# Patient Record
Sex: FEMALE | Race: WHITE | Employment: FULL TIME | URBAN - METROPOLITAN AREA
[De-identification: names, ages, dates, MRNs, and addresses within clinical notes are randomized per-mention and may not be internally consistent; named-entity substitution may affect disease eponyms.]

---

## 2018-02-15 ENCOUNTER — OFFICE VISIT (OUTPATIENT)
Dept: INTERNAL MEDICINE CLINIC | Age: 33
End: 2018-02-15

## 2018-02-15 VITALS
BODY MASS INDEX: 34.89 KG/M2 | OXYGEN SATURATION: 98 % | WEIGHT: 204.4 LBS | HEIGHT: 64 IN | SYSTOLIC BLOOD PRESSURE: 114 MMHG | HEART RATE: 77 BPM | DIASTOLIC BLOOD PRESSURE: 76 MMHG

## 2018-02-15 DIAGNOSIS — E66.09 CLASS 2 OBESITY DUE TO EXCESS CALORIES WITHOUT SERIOUS COMORBIDITY WITH BODY MASS INDEX (BMI) OF 35.0 TO 35.9 IN ADULT: ICD-10-CM

## 2018-02-15 DIAGNOSIS — M54.41 CHRONIC RIGHT-SIDED LOW BACK PAIN WITH RIGHT-SIDED SCIATICA: ICD-10-CM

## 2018-02-15 DIAGNOSIS — Z00.00 GENERAL MEDICAL EXAMINATION: ICD-10-CM

## 2018-02-15 DIAGNOSIS — R10.84 GENERALIZED ABDOMINAL PAIN: ICD-10-CM

## 2018-02-15 DIAGNOSIS — G89.29 CHRONIC RIGHT-SIDED LOW BACK PAIN WITH RIGHT-SIDED SCIATICA: ICD-10-CM

## 2018-02-15 DIAGNOSIS — Z23 NEED FOR PROPHYLACTIC VACCINATION AGAINST DIPHTHERIA-TETANUS-PERTUSSIS (DTP): ICD-10-CM

## 2018-02-15 DIAGNOSIS — Z00.00 GENERAL MEDICAL EXAMINATION: Primary | ICD-10-CM

## 2018-02-15 DIAGNOSIS — Z11.4 ENCOUNTER FOR SCREENING FOR HIV: ICD-10-CM

## 2018-02-15 LAB
A/G RATIO: 1.9 (ref 1.1–2.2)
ALBUMIN SERPL-MCNC: 4.5 G/DL (ref 3.4–5)
ALP BLD-CCNC: 40 U/L (ref 40–129)
ALT SERPL-CCNC: 20 U/L (ref 10–40)
ANION GAP SERPL CALCULATED.3IONS-SCNC: 12 MMOL/L (ref 3–16)
AST SERPL-CCNC: 18 U/L (ref 15–37)
BILIRUB SERPL-MCNC: 0.6 MG/DL (ref 0–1)
BUN BLDV-MCNC: 16 MG/DL (ref 7–20)
CALCIUM SERPL-MCNC: 9.3 MG/DL (ref 8.3–10.6)
CHLORIDE BLD-SCNC: 106 MMOL/L (ref 99–110)
CHOLESTEROL, TOTAL: 163 MG/DL (ref 0–199)
CO2: 27 MMOL/L (ref 21–32)
CREAT SERPL-MCNC: 0.5 MG/DL (ref 0.6–1.1)
GFR AFRICAN AMERICAN: >60
GFR NON-AFRICAN AMERICAN: >60
GLOBULIN: 2.4 G/DL
GLUCOSE BLD-MCNC: 104 MG/DL (ref 70–99)
HCT VFR BLD CALC: 39.4 % (ref 36–48)
HDLC SERPL-MCNC: 40 MG/DL (ref 40–60)
HEMOGLOBIN: 13.7 G/DL (ref 12–16)
LDL CHOLESTEROL CALCULATED: 95 MG/DL
MCH RBC QN AUTO: 30.6 PG (ref 26–34)
MCHC RBC AUTO-ENTMCNC: 34.8 G/DL (ref 31–36)
MCV RBC AUTO: 87.9 FL (ref 80–100)
PDW BLD-RTO: 12.8 % (ref 12.4–15.4)
PLATELET # BLD: 272 K/UL (ref 135–450)
PMV BLD AUTO: 9.6 FL (ref 5–10.5)
POTASSIUM SERPL-SCNC: 4.4 MMOL/L (ref 3.5–5.1)
RBC # BLD: 4.48 M/UL (ref 4–5.2)
SODIUM BLD-SCNC: 145 MMOL/L (ref 136–145)
TOTAL PROTEIN: 6.9 G/DL (ref 6.4–8.2)
TRIGL SERPL-MCNC: 142 MG/DL (ref 0–150)
VLDLC SERPL CALC-MCNC: 28 MG/DL
WBC # BLD: 8.5 K/UL (ref 4–11)

## 2018-02-15 PROCEDURE — 99385 PREV VISIT NEW AGE 18-39: CPT | Performed by: NURSE PRACTITIONER

## 2018-02-15 PROCEDURE — 90471 IMMUNIZATION ADMIN: CPT | Performed by: NURSE PRACTITIONER

## 2018-02-15 PROCEDURE — 90715 TDAP VACCINE 7 YRS/> IM: CPT | Performed by: NURSE PRACTITIONER

## 2018-02-15 RX ORDER — NAPROXEN 250 MG/1
250 TABLET ORAL 2 TIMES DAILY WITH MEALS
COMMUNITY
End: 2018-04-16 | Stop reason: ALTCHOICE

## 2018-02-15 RX ORDER — IBUPROFEN 200 MG
200 TABLET ORAL EVERY 6 HOURS PRN
COMMUNITY
End: 2018-04-16 | Stop reason: ALTCHOICE

## 2018-02-15 RX ORDER — CYCLOBENZAPRINE HCL 5 MG
5 TABLET ORAL 3 TIMES DAILY PRN
Qty: 20 TABLET | Refills: 0 | Status: SHIPPED | OUTPATIENT
Start: 2018-02-15 | End: 2019-03-19 | Stop reason: CLARIF

## 2018-02-15 RX ORDER — PREDNISONE 10 MG/1
TABLET ORAL
Qty: 20 TABLET | Refills: 0 | Status: SHIPPED | OUTPATIENT
Start: 2018-02-15 | End: 2018-02-26 | Stop reason: ALTCHOICE

## 2018-02-15 RX ORDER — DICYCLOMINE HYDROCHLORIDE 10 MG/1
10 CAPSULE ORAL 4 TIMES DAILY
Qty: 120 CAPSULE | Refills: 3 | Status: SHIPPED | OUTPATIENT
Start: 2018-02-15 | End: 2019-03-19 | Stop reason: CLARIF

## 2018-02-15 ASSESSMENT — PATIENT HEALTH QUESTIONNAIRE - PHQ9
2. FEELING DOWN, DEPRESSED OR HOPELESS: 0
SUM OF ALL RESPONSES TO PHQ9 QUESTIONS 1 & 2: 0
SUM OF ALL RESPONSES TO PHQ QUESTIONS 1-9: 0
1. LITTLE INTEREST OR PLEASURE IN DOING THINGS: 0

## 2018-02-15 NOTE — PROGRESS NOTES
Position: Sitting, Cuff Size: Medium Adult)   Pulse 77   Ht 5' 3.5\" (1.613 m)   Wt 204 lb 6.4 oz (92.7 kg)   LMP 01/20/2018 (Approximate)   SpO2 98%   BMI 35.64 kg/m²   BP Readings from Last 3 Encounters:   02/15/18 114/76   09/23/14 118/76   05/22/13 99/51     Wt Readings from Last 3 Encounters:   02/15/18 204 lb 6.4 oz (92.7 kg)   09/23/14 208 lb 12.8 oz (94.7 kg)   05/22/13 195 lb (88.5 kg)       Physical Exam   Constitutional: She is oriented to person, place, and time. She appears well-developed and well-nourished. HENT:   Right Ear: External ear normal.   Left Ear: External ear normal.   Eyes: Conjunctivae, EOM and lids are normal. Pupils are equal, round, and reactive to light. Right conjunctiva is not injected. Left conjunctiva is not injected. No scleral icterus. Right eye exhibits no nystagmus. Left eye exhibits no nystagmus. Pupils are equal.   Neck: Trachea normal. Normal carotid pulses and no JVD present. Carotid bruit is not present. No thyroid mass and no thyromegaly present. Cardiovascular: Normal rate, regular rhythm, S1 normal and S2 normal.    No murmur heard. Pulses:       Dorsalis pedis pulses are 2+ on the right side, and 2+ on the left side. Posterior tibial pulses are 2+ on the right side, and 2+ on the left side. Pulmonary/Chest: Breath sounds normal. She has no wheezes. She has no rhonchi. She has no rales. Abdominal: Soft. Bowel sounds are normal. She exhibits no mass. There is no hepatosplenomegaly. There is tenderness. There is no rigidity, no guarding and no CVA tenderness. No hernia. Musculoskeletal:        Lumbar back: She exhibits tenderness. She exhibits normal range of motion and no bony tenderness (paraspinal muscle tenderness).    - Tenderness to Palpation: right paralumbar region   - Range of Motion:     flexion- diminished range of motion     extension- diminished range of motion    rotation- full range of motion, diminished    Lymphadenopathy: Head (right side): No submental, no submandibular, no tonsillar, no preauricular, no posterior auricular and no occipital adenopathy present. Head (left side): No submental, no submandibular, no tonsillar, no preauricular, no posterior auricular and no occipital adenopathy present. She has no cervical adenopathy. Neurological: She is alert and oriented to person, place, and time. She displays no tremor. No cranial nerve deficit or sensory deficit. Reflex Scores:       Bicep reflexes are 2+ on the right side and 2+ on the left side. Patellar reflexes are 2+ on the right side and 2+ on the left side. Neurological:   - Straight leg raise is positive on the right.   - Gait is normal.   Psychiatric: She has a normal mood and affect. Her speech is normal and behavior is normal. Judgment and thought content normal. Cognition and memory are normal.       Lab Results   Component Value Date    WBC 8.7 09/23/2014    HGB 13.9 09/23/2014    HCT 40.6 09/23/2014    MCV 86.1 09/23/2014     09/23/2014     Lab Results   Component Value Date     09/23/2014    K 3.5 09/23/2014     09/23/2014    CO2 25 09/23/2014    BUN 10 09/23/2014    CREATININE 0.7 09/23/2014    GLUCOSE 122 (H) 09/23/2014    CALCIUM 9.2 09/23/2014    PROT 7.2 09/23/2014    LABALBU 4.3 09/23/2014    BILITOT 0.5 09/23/2014    ALKPHOS 49 09/23/2014    AST 19 09/23/2014    ALT 35 09/23/2014    LABGLOM 117 09/23/2014    AGRATIO 1.5 09/23/2014    GLOB 2.9 09/23/2014     Lab Results   Component Value Date    CHOL 170 09/23/2014     Lab Results   Component Value Date    TRIG 238 (H) 09/23/2014     Lab Results   Component Value Date    HDL 30 (L) 09/23/2014     Lab Results   Component Value Date    LDLCHOLESTEROL 92 09/23/2014     No results found for: LABA1C  Lab Results   Component Value Date    TSH 1.1 09/23/2014       ASSESSMENT:      1. General medical examination    2.  Chronic right-sided low back pain with right-sided sciatica

## 2018-02-15 NOTE — PATIENT INSTRUCTIONS
Patient Education        Well Visit, Ages 25 to 48: Care Instructions  Your Care Instructions    Physical exams can help you stay healthy. Your doctor has checked your overall health and may have suggested ways to take good care of yourself. He or she also may have recommended tests. At home, you can help prevent illness with healthy eating, regular exercise, and other steps. Follow-up care is a key part of your treatment and safety. Be sure to make and go to all appointments, and call your doctor if you are having problems. It's also a good idea to know your test results and keep a list of the medicines you take. How can you care for yourself at home? · Reach and stay at a healthy weight. This will lower your risk for many problems, such as obesity, diabetes, heart disease, and high blood pressure. · Get at least 30 minutes of physical activity on most days of the week. Walking is a good choice. You also may want to do other activities, such as running, swimming, cycling, or playing tennis or team sports. Discuss any changes in your exercise program with your doctor. · Do not smoke or allow others to smoke around you. If you need help quitting, talk to your doctor about stop-smoking programs and medicines. These can increase your chances of quitting for good. · Talk to your doctor about whether you have any risk factors for sexually transmitted infections (STIs). Having one sex partner (who does not have STIs and does not have sex with anyone else) is a good way to avoid these infections. · Use birth control if you do not want to have children at this time. Talk with your doctor about the choices available and what might be best for you. · Protect your skin from too much sun. When you're outdoors from 10 a.m. to 4 p.m., stay in the shade or cover up with clothing and a hat with a wide brim. Wear sunglasses that block UV rays.  Even when it's cloudy, put broad-spectrum sunscreen (SPF 30 or higher) on any exposed skin. · See a dentist one or two times a year for checkups and to have your teeth cleaned. · Wear a seat belt in the car. · Drink alcohol in moderation, if at all. That means no more than 2 drinks a day for men and 1 drink a day for women. Follow your doctor's advice about when to have certain tests. These tests can spot problems early. For everyone  · Cholesterol. Have the fat (cholesterol) in your blood tested after age 21. Your doctor will tell you how often to have this done based on your age, family history, or other things that can increase your risk for heart disease. · Blood pressure. Have your blood pressure checked during a routine doctor visit. Your doctor will tell you how often to check your blood pressure based on your age, your blood pressure results, and other factors. · Vision. Talk with your doctor about how often to have a glaucoma test.  · Diabetes. Ask your doctor whether you should have tests for diabetes. · Colon cancer. Have a test for colon cancer at age 48. You may have one of several tests. If you are younger than 48, you may need a test earlier if you have any risk factors. Risk factors include whether you already had a precancerous polyp removed from your colon or whether your parent, brother, sister, or child has had colon cancer. For women  · Breast exam and mammogram. Talk to your doctor about when you should have a clinical breast exam and a mammogram. Medical experts differ on whether and how often women under 50 should have these tests. Your doctor can help you decide what is right for you. · Pap test and pelvic exam. Begin Pap tests at age 24. A Pap test is the best way to find cervical cancer. The test often is part of a pelvic exam. Ask how often to have this test.  · Tests for sexually transmitted infections (STIs). Ask whether you should have tests for STIs.  You may be at risk if you have sex with more than one person, especially if your partners do not wear condoms. For men  · Tests for sexually transmitted infections (STIs). Ask whether you should have tests for STIs. You may be at risk if you have sex with more than one person, especially if you do not wear a condom. · Testicular cancer exam. Ask your doctor whether you should check your testicles regularly. · Prostate exam. Talk to your doctor about whether you should have a blood test (called a PSA test) for prostate cancer. Experts differ on whether and when men should have this test. Some experts suggest it if you are older than 39 and are -American or have a father or brother who got prostate cancer when he was younger than 72. When should you call for help? Watch closely for changes in your health, and be sure to contact your doctor if you have any problems or symptoms that concern you. Where can you learn more? Go to https://[email or web address].[email or web address]. org and sign in to your [employer] account. Enter P072 in the Epuls box to learn more about \"Well Visit, Ages 25 to 48: Care Instructions. \"     If you do not have an account, please click on the \"Sign Up Now\" link. Current as of: May 12, 2017  Content Version: 11.5  © 1465-6432 [employer]. Care instructions adapted under license by [facility] ([facility]). If you have questions about a medical condition or this instruction, always ask your healthcare professional. [place] [address] any warranty or liability for your use of this information. Patient Education        Back Stretches: Exercises  Your Care Instructions  Here are some examples of exercises for stretching your back. Start each exercise slowly. Ease off the exercise if you start to have pain. Your doctor or physical therapist will tell you when you can start these exercises and which ones will work best for you. How to do the exercises  Overhead stretch    1. Stand comfortably with your feet shoulder-width apart.   2. Looking straight ahead, elva. [address] any warranty or liability for your use of this information. Patient Education          dicyclomine  Pronunciation:  quynh GREWAL dahlia myers  Brand:  Thomas  What is the most important information I should know about dicyclomine? This medication may impair your thinking or reactions. Be careful if you drive or do anything that requires you to be alert. Drinking alcohol can increase certain side effects of dicyclomine. Avoid becoming overheated or dehydrated during exercise and in hot weather. Dicyclomine can decrease your sweating, which can lead to heat stroke in a hot environment. Stop using dicyclomine and call your doctor right away if you have serious side effects such as confusion, hallucinations, unusual thoughts or behavior, fast or uneven heart rate, or if you urinate less than usual or not at all. There are many other medicines that can interact with dicyclomine. Tell your doctor about all the prescription and over-the-counter medications you use. This includes vitamins, minerals, herbal products, and drugs prescribed by other doctors. Do not start using a new medication without telling your doctor. Keep a list with you of all the medicines you use and show this list to any doctor or other healthcare provider who treats you. What is dicyclomine? Dicyclomine relieves spasms of the muscles in the stomach and intestines. Dicyclomine is used to treat functional bowel or irritable bowel syndrome. Dicyclomine may also be used for purposes not listed in this medication guide. What should I discuss with my healthcare provider before taking dicyclomine?   You should not take this medication if you are allergic to dicyclomine, or if you have:  · problems with urination;  · a bowel obstruction or severe constipation;  · severe ulcerative colitis or toxic megacolon;  · gastroesophageal reflux disease (GERD);  · a serious heart condition or active interact with dicyclomine. Tell your doctor about all medications you use. This includes prescription, over-the-counter, vitamin, and herbal products. Do not start a new medication without telling your doctor. Where can I get more information? Your pharmacist can provide more information about dicyclomine. Remember, keep this and all other medicines out of the reach of children, never share your medicines with others, and use this medication only for the indication prescribed. Every effort has been made to ensure that the information provided by [address] is accurate, up-to-date, and complete, but no guarantee is made to that effect. Drug information contained herein may be time sensitive. [facility] information has been compiled for use by healthcare practitioners and consumers in the [place] and therefore [facility] does not warrant that uses outside of the [place] are appropriate, unless specifically indicated otherwise. [facility]'s drug information does not endorse drugs, diagnose patients or recommend therapy. [facility] drug information is an informational resource designed to assist licensed healthcare practitioners in caring for their patients and/or to serve consumers viewing this service as a supplement to, and not a substitute for, the expertise, skill, knowledge and judgment of healthcare practitioners. The absence of a warning for a given drug or drug combination in no way should be construed to indicate that the drug or drug combination is safe, effective or appropriate for any given patient. [facility] does not assume any responsibility for any aspect of healthcare administered with the aid of information [facility] provides. The information contained herein is not intended to cover all possible uses, directions, precautions, warnings, drug interactions, allergic reactions, or adverse effects.  If you have questions about the drugs you are taking, check with your doctor, nurse or

## 2018-02-16 LAB
HIV AG/AB: NORMAL
HIV ANTIGEN: NORMAL
HIV-1 ANTIBODY: NORMAL
HIV-2 AB: NORMAL

## 2018-02-26 ENCOUNTER — OFFICE VISIT (OUTPATIENT)
Dept: GASTROENTEROLOGY | Age: 33
End: 2018-02-26

## 2018-02-26 VITALS
OXYGEN SATURATION: 98 % | BODY MASS INDEX: 37.25 KG/M2 | SYSTOLIC BLOOD PRESSURE: 102 MMHG | HEIGHT: 62 IN | DIASTOLIC BLOOD PRESSURE: 84 MMHG | WEIGHT: 202.4 LBS | HEART RATE: 94 BPM

## 2018-02-26 DIAGNOSIS — R19.4 ALTERED BOWEL HABITS: ICD-10-CM

## 2018-02-26 DIAGNOSIS — R14.0 ABDOMINAL BLOATING: ICD-10-CM

## 2018-02-26 DIAGNOSIS — G89.29 CHRONIC RIGHT-SIDED LOW BACK PAIN WITH RIGHT-SIDED SCIATICA: Primary | ICD-10-CM

## 2018-02-26 DIAGNOSIS — R19.7 DIARRHEA, UNSPECIFIED TYPE: ICD-10-CM

## 2018-02-26 DIAGNOSIS — M54.41 CHRONIC RIGHT-SIDED LOW BACK PAIN WITH RIGHT-SIDED SCIATICA: Primary | ICD-10-CM

## 2018-02-26 DIAGNOSIS — R11.0 NAUSEA: ICD-10-CM

## 2018-02-26 DIAGNOSIS — R10.13 EPIGASTRIC PAIN: Primary | ICD-10-CM

## 2018-02-26 PROCEDURE — 99204 OFFICE O/P NEW MOD 45 MIN: CPT | Performed by: NURSE PRACTITIONER

## 2018-02-26 RX ORDER — POLYETHYLENE GLYCOL 3350 17 G/17G
238 POWDER, FOR SOLUTION ORAL DAILY
Qty: 7140 G | Refills: 0 | Status: SHIPPED | OUTPATIENT
Start: 2018-02-26 | End: 2018-03-28

## 2018-02-26 ASSESSMENT — ENCOUNTER SYMPTOMS
DIARRHEA: 1
SHORTNESS OF BREATH: 0
WHEEZING: 0
VOMITING: 0
SPUTUM PRODUCTION: 0
HEMOPTYSIS: 0
PHOTOPHOBIA: 0
NAUSEA: 0
CONSTIPATION: 1
COUGH: 0
BLURRED VISION: 0
DOUBLE VISION: 0
BLOOD IN STOOL: 0
BACK PAIN: 1
HEARTBURN: 0
ABDOMINAL PAIN: 1

## 2018-02-26 NOTE — PATIENT INSTRUCTIONS
Patient Education        Frequent Abdominal Pain: Care Instructions  Your Care Instructions    Frequent abdominal pain means you have belly pain that occurs at least 3 times over 3 months. Sometimes the pain is linked to eating certain foods or having a bowel movement. But most of the time the pain cannot be explained. Your doctor may use the words \"functional abdominal pain\" or \"recurrent abdominal pain\" to describe the problem. It can be hard to deal with pain when your doctor cannot find a cause, even after tests are done. When the pain is very bad, it can keep you from doing your normal activities. Sometimes stress can make your pain worse. Even if you cannot make the pain go away, there are things you can do to make it a little easier to manage. Follow-up care is a key part of your treatment and safety. Be sure to make and go to all appointments, and call your doctor if you are having problems. It's also a good idea to know your test results and keep a list of the medicines you take. How can you care for yourself at home? · Keep a symptom diary. This can help you see if there are events or emotions that make your pain worse. Think about what you ate, drank, or felt before the pain began. Maybe pain comes after a stressful meeting or when you have spicy foods, dairy products, or alcohol. · Reduce stress. Breathing exercises and relaxation techniques can help. Try taking a walk or doing other exercise when you feel stressed. · Try cognitive-behavioral therapy. You can work with a counselor to learn how to do this therapy. It can help you cope with pain by changing how you think. It can help you notice discouraging thoughts that make you feel bad. When should you call for help? Call your doctor now or seek immediate medical care if:  ? · You have a fever and belly pain. ? · You have severe pain that is different from your usual belly pain. ? Watch closely for changes in your health, and be sure to your procedure may be canceled. If your doctor told you to take your medicines on the day of the procedure, take them with only a sip of water. ? · Take a bath or shower before you come in for your procedure. Do not apply lotions, perfumes, deodorants, or nail polish. ? · Take off all jewelry and piercings. And take out contact lenses, if you wear them. ? At the hospital or surgery center   · Bring a picture ID. ? · The test may take 15 to 30 minutes. ? · The doctor may spray medicine on the back of your throat to numb it. You also will get medicine to prevent pain and to relax you. ? · You will lie on your left side. The doctor will put the scope in your mouth and toward the back of your throat. The doctor will tell you when to swallow. This helps the scope move down your throat. You will be able to breathe normally. The doctor will move the scope down your esophagus into your stomach. The doctor also may look at the duodenum. ? · If your doctor wants to take a sample of tissue for a biopsy, he or she may use small surgical tools, which are put into the scope, to cut off some tissue. You will not feel a biopsy, if one is taken. The doctor also can use the tools to stop bleeding or to do other treatments, if needed. ? · You will stay at the hospital or surgery center for 1 to 2 hours until the medicine you were given wears off. Going home   · Be sure you have someone to drive you home. Anesthesia and pain medicine make it unsafe for you to drive. ? · You will be given more specific instructions about recovering from your procedure. They will cover things like diet, wound care, follow-up care, driving, and getting back to your normal routine. When should you call your doctor? · You have questions or concerns. ? · You don't understand how to prepare for your procedure. ? · You become ill before the procedure (such as fever, flu, or a cold).    ? · You need to reschedule or have changed your ? · If you take blood thinners, such as warfarin (Coumadin), clopidogrel (Plavix), or aspirin, be sure to talk to your doctor. He or she will tell you if you should stop taking these medicines before your procedure. Make sure that you understand exactly what your doctor wants you to do.   ? · Your doctor will tell you which medicines to take or stop before your procedure. You may need to stop taking certain medicines a week or more before the procedure. So talk to your doctor as soon as you can.   ? · If you have an advance directive, let your doctor know. It may include a living will and a durable power of  for health care. Bring a copy to the hospital. If you don't have one, you may want to prepare one. It lets your doctor and loved ones know your health care wishes. Doctors advise that everyone prepare these papers before any type of surgery or procedure. ? Before the procedure  ? · Follow your doctor's directions about when to stop eating solid foods and drink only clear liquids. You can drink water, clear juices, clear broths, flavored ice pops, and gelatin (such as Jell-O). Do not eat or drink anything red or purple. This includes grape juice and grape-flavored ice pops. It also includes fruit punch and cherry gelatin. ? · Drink the \"colon prep\" liquid as your doctor tells you. You will want to stay home, because the liquid will make you go to the bathroom a lot. Your stools will be loose and watery. It is very important to drink all of the liquid. If you have problems drinking it, call your doctor. Some doctors may have you take a tablet rather than drink a liquid. ? · Do not eat any solid foods after you drink the colon prep. ? · Stop drinking clear liquids 6 to 8 hours before the test.   Procedures can be stressful. This information will help you understand what you can expect. And it will help you safely prepare for your procedure. What happens on the day of the procedure?    · Follow the

## 2018-02-26 NOTE — PROGRESS NOTES
Range Status    HIV Ag/Ab 02/15/2018 Non-Reactive  Non-reactive Final    HIV-1 Antibody 02/15/2018 Non-Reactive  Non-reactive Final    HIV ANTIGEN 02/15/2018 Non-Reactive  Non-reactive Final    HIV-2 Ab 02/15/2018 Non-Reactive  Non-reactive Final    WBC 02/15/2018 8.5  4.0 - 11.0 K/uL Final    RBC 02/15/2018 4.48  4.00 - 5.20 M/uL Final    Hemoglobin 02/15/2018 13.7  12.0 - 16.0 g/dL Final    Hematocrit 02/15/2018 39.4  36.0 - 48.0 % Final    MCV 02/15/2018 87.9  80.0 - 100.0 fL Final    MCH 02/15/2018 30.6  26.0 - 34.0 pg Final    MCHC 02/15/2018 34.8  31.0 - 36.0 g/dL Final    RDW 02/15/2018 12.8  12.4 - 15.4 % Final    Platelets 83/29/6953 272  135 - 450 K/uL Final    MPV 02/15/2018 9.6  5.0 - 10.5 fL Final    Sodium 02/15/2018 145  136 - 145 mmol/L Final    Potassium 02/15/2018 4.4  3.5 - 5.1 mmol/L Final    Chloride 02/15/2018 106  99 - 110 mmol/L Final    CO2 02/15/2018 27  21 - 32 mmol/L Final    Anion Gap 02/15/2018 12  3 - 16 Final    Glucose 02/15/2018 104* 70 - 99 mg/dL Final    BUN 02/15/2018 16  7 - 20 mg/dL Final    CREATININE 02/15/2018 0.5* 0.6 - 1.1 mg/dL Final    GFR Non- 02/15/2018 >60  >60 Final    Comment: >60 mL/min/1.73m2 EGFR, calc. for ages 25 and older using the  MDRD formula (not corrected for weight), is valid for stable  renal function.  GFR  02/15/2018 >60  >60 Final    Comment: Chronic Kidney Disease: less than 60 ml/min/1.73 sq.m. Kidney Failure: less than 15 ml/min/1.73 sq.m. Results valid for patients 18 years and older.       Calcium 02/15/2018 9.3  8.3 - 10.6 mg/dL Final    Total Protein 02/15/2018 6.9  6.4 - 8.2 g/dL Final    Alb 02/15/2018 4.5  3.4 - 5.0 g/dL Final    Albumin/Globulin Ratio 02/15/2018 1.9  1.1 - 2.2 Final    Total Bilirubin 02/15/2018 0.6  0.0 - 1.0 mg/dL Final    Alkaline Phosphatase 02/15/2018 40  40 - 129 U/L Final    ALT 02/15/2018 20  10 - 40 U/L Final    AST 02/15/2018 18  15 - 37 U/L Final    Globulin 02/15/2018 2.4  g/dL Final    Cholesterol, Total 02/15/2018 163  0 - 199 mg/dL Final    Triglycerides 02/15/2018 142  0 - 150 mg/dL Final    HDL 02/15/2018 40  40 - 60 mg/dL Final    LDL Calculated 02/15/2018 95  <100 mg/dL Final    VLDL Cholesterol Calculated 02/15/2018 28  Not Established mg/dL Final       Assessment      ICD-10-CM ICD-9-CM    1. Epigastric pain R10.13 789.06 ESOPHAGOSCOPY / EGD   2. Nausea R11.0 787.02 ESOPHAGOSCOPY / EGD   3. Abdominal bloating R14.0 787.3 COLONOSCOPY (Diagnostic)      ESOPHAGOSCOPY / EGD   4. Diarrhea, unspecified type R19.7 787.91 COLONOSCOPY (Diagnostic)   5. Altered bowel habits R19.4 787.99        Plan    1. Will plan for an EGD/colonoscopy with MAC anesthesia. The patient was informed of the risks and benefits of the procedures. 2.   The patient was encouraged to keep tract of foods that worsen her pain may be a allergic response. 3.  The patient was provided with information on gas and bloating. Avoid foods that worsen. 4.  The patient was encouraged to lose weight at least 10% of her body weight. CT showed fatty liver in 2013. Avoid excess sugar intake. 5.  Will obtain [place] [facility] [place]  6. Further recommendations for follow-up will be determined after the EGD/colonoscopy have been completed.

## 2018-02-28 ENCOUNTER — HOSPITAL ENCOUNTER (OUTPATIENT)
Dept: GENERAL RADIOLOGY | Age: 33
Discharge: HOME | End: 2018-02-28
Attending: NURSE PRACTITIONER | Admitting: NURSE PRACTITIONER

## 2018-02-28 DIAGNOSIS — M54.41 CHRONIC RIGHT-SIDED LOW BACK PAIN WITH RIGHT-SIDED SCIATICA: ICD-10-CM

## 2018-02-28 DIAGNOSIS — G89.29 CHRONIC RIGHT-SIDED LOW BACK PAIN WITH RIGHT-SIDED SCIATICA: ICD-10-CM

## 2018-03-02 ENCOUNTER — OFFICE VISIT (OUTPATIENT)
Dept: INTERNAL MEDICINE CLINIC | Age: 33
End: 2018-03-02

## 2018-03-02 ENCOUNTER — HOSPITAL ENCOUNTER (OUTPATIENT)
Dept: PHYSICAL THERAPY | Age: 33
Discharge: HOME | End: 2018-03-31
Attending: NURSE PRACTITIONER | Admitting: NURSE PRACTITIONER

## 2018-03-02 VITALS — HEART RATE: 89 BPM | SYSTOLIC BLOOD PRESSURE: 112 MMHG | OXYGEN SATURATION: 98 % | DIASTOLIC BLOOD PRESSURE: 76 MMHG

## 2018-03-02 DIAGNOSIS — G89.29 CHRONIC RIGHT-SIDED LOW BACK PAIN WITH RIGHT-SIDED SCIATICA: Primary | ICD-10-CM

## 2018-03-02 DIAGNOSIS — M54.41 CHRONIC RIGHT-SIDED LOW BACK PAIN WITH RIGHT-SIDED SCIATICA: Primary | ICD-10-CM

## 2018-03-02 PROCEDURE — 99213 OFFICE O/P EST LOW 20 MIN: CPT | Performed by: NURSE PRACTITIONER

## 2018-03-02 RX ORDER — TRAMADOL HYDROCHLORIDE 50 MG/1
50 TABLET ORAL EVERY 6 HOURS PRN
Qty: 25 TABLET | Refills: 0 | Status: SHIPPED | OUTPATIENT
Start: 2018-03-02 | End: 2018-03-09

## 2018-03-02 NOTE — PROGRESS NOTES
Pt is scheduled for MRI L-Spine at [facility] 3/9/18, arrival 3:45. Pt informed no zippers/metals.  Order given to pt to take w/ her to the test. Order faxed to central.

## 2018-03-03 ENCOUNTER — HOSPITAL ENCOUNTER (OUTPATIENT)
Dept: PHYSICAL THERAPY | Age: 33
Discharge: HOME | End: 2018-03-03
Admitting: NURSE PRACTITIONER

## 2018-03-03 NOTE — FLOWSHEET NOTE
Physical Therapy Daily Treatment Note  Date:  3/3/2018    Patient Name:  Deangelo De La Rosa    :  1985  MRN: [id number]  Restrictions/Precautions:  none        Insurance/Certification information:  [place] 20%  Next Physician Visit:  Referring Physician:  Sunny RESTREPO EXPIRATION DATE  Visit# / total visits:2  /  10                Initial Pain level: 7/10 Right low back, gluteal and pain posterior thigh and at times to calf     Subjective:  Pt states she is getting her MRI on Friday. States the previous day was really bad with pain at 9/10. Pt states issues with initiating walking after transition from sit to stand. States most radicular symptoms only go to the thigh but with driving it will be the entire leg. Clinical Presentation: Pt presents with signs and symptoms consistent with a central to right sided herniated disc. She has cross SLR sign where SLR on left side reproduces right leg pain,  painful SLR, loss of both knee DTRs and right ankle DTRs, and she reports right leg weakness and severe pain with sneezing. Today she started with extension exs-- lying prone  20 min every 2 hours, avoiding flexion. Patient Education: yes    Any changes to Ambulatory Summary Sheet? no             Goals:     Long term goals  Time Frame for Long term goals : 60 days   Long term goal 1: indep with home program  Long term goal 2: decrease pain to 0 to 2/10  Long term goal 3: no leg pain  Long term goal 4: improve functional score by 15 points on Oswestry  Patient's goal: much less pain  Skilled PT activities: Date  3/3/18 Date 3/3/18 #2 Date Date     Outcome measure used          On visit#  oswetry          Prone Lni ext exs.    Explained protocol  Asked to avoid sitting , when she has to sit, sit with a rolled beach towel at low back, consider working from home, in order to lie down every 2 hours        Lying prone 20 min  Decreased pain greatly, no right leg pain   Pt asked to do this every 2 hours         Pelvic traction prone   Static  Straight pull with belts  Consider using older belts as she is petite Next visit   Start with 45 lb   Static   10 min only 45# static, 5 steps up/down, x 10 min w/ 10 min set up using old belts. Rolled towel to prop head on and towel under the bar for comfort. Pt to lay x 5 min after in prone before getting up    Can increase up to 5-10 # of pull next visit per pt comfort       5 min rest prone after traction with belt off Next  X 5 min                Standing leaning backwards gently and holding Teach next visit Next visit                                                                        Progress/goals assessment (every 10 visits) by  PT    Ed pt on the lumbar roll in the car and where she sits at home.   Cont with the propping       HEP: above As above     Objective   findings: See presentation Good tolerance to the traction     Provider interaction:   Monitoring to ensure safe and effective activity performance     Response to intervention:   Much less pain with ext exs Less pain and radicular symptoms     Plan for Next Session: Traction  exte exs Traction  exte exs       Modality/intervention used:  [] Therapeutic Exercise  [] Modalities:  [] Therapeutic Activity     [] Ultrasound  [] Elec  Stim  [] Gait Training      [] Cervical Traction [x] Lumbar Traction  [] Neuromuscular Re-education    [] Cold/hotpack [] Iontophoresis   [] Instruction in HEP      [] Vasopneumatic     [] Manual Therapy               [x] Self care home management                    (    ) Dry needling    Post Tx Pain Ratin/10 with decrease in radicular symptoms     Plan:(Fequency/duration/# of visits) 2 to 3 times a week 10 visits  [x] Continue per plan of care [] Alter current plan   [] Plan of care initiated [] Hold pending MD visit [] Discharge     Time In: 921  Time Out: 949  Timed Code Treatment Minutes: 8  Total Treatment Minutes:  28    Electronically signed by:  Radha Farley

## 2018-03-05 ENCOUNTER — TELEPHONE (OUTPATIENT)
Dept: GASTROENTEROLOGY | Age: 33
End: 2018-03-05

## 2018-03-06 ENCOUNTER — TELEPHONE (OUTPATIENT)
Dept: GASTROENTEROLOGY | Age: 33
End: 2018-03-06

## 2018-03-07 ENCOUNTER — HOSPITAL ENCOUNTER (OUTPATIENT)
Dept: PHYSICAL THERAPY | Age: 33
Discharge: HOME | End: 2018-03-07
Admitting: NURSE PRACTITIONER

## 2018-03-07 NOTE — ANESTHESIA PRE-OP
daily (with meals)      dicyclomine (BENTYL) 10 MG capsule Take 1 capsule by mouth 4 times daily 120 capsule 3    cyclobenzaprine (FLEXERIL) 5 MG tablet Take 1 tablet by mouth 3 times daily as needed for Muscle spasms 20 tablet 0     No current facility-administered medications for this encounter. Allergies: Allergies   Allergen Reactions    Pcn [Penicillins] Hives    Ethanol Rash       Problem List:    Patient Active Problem List   Diagnosis Code    Migraine G43.909    Fatigue R53.83    Obesity E66.9       Past Medical History:        Diagnosis Date    GERD (gastroesophageal reflux disease)        Past Surgical History:        Procedure Laterality Date    APPENDECTOMY  Feb 2004    CHOLECYSTECTOMY  Sep 2006    ENDOMETRIAL ABLATION  Dec 2012    TUBAL LIGATION  Sep 2005       Social History:    Social History   Substance Use Topics    Smoking status: Never Smoker    Smokeless tobacco: Never Used    Alcohol use No      Comment: Allergic                                Counseling given: Not Answered      Vital Signs (Current): There were no vitals filed for this visit. BP Readings from Last 3 Encounters:   03/02/18 112/76   02/26/18 102/84   02/15/18 114/76       NPO Status:                                                                                 BMI:   Wt Readings from Last 3 Encounters:   03/05/18 199 lb (90.3 kg)   02/26/18 202 lb 6.4 oz (91.8 kg)   02/15/18 204 lb 6.4 oz (92.7 kg)     There is no height or weight on file to calculate BMI.     Anesthesia Evaluation    Airway:         Dental:          Pulmonary:                              Cardiovascular:                      Neuro/Psych:   (+) headaches:,             GI/Hepatic/Renal:   (+) GERD:,           Endo/Other:                     Abdominal:           Vascular:                                        Anesthesia Plan      general         Induction:

## 2018-03-07 NOTE — FLOWSHEET NOTE
visit [] Discharge     Time In: 1630  Time Out:  1650  Timed Code Treatment Minutes: 8  Total Treatment Minutes:  20    Electronically signed by:  Paige Ashton, 3/7/2018, 4:43 PM

## 2018-03-08 ENCOUNTER — HOSPITAL ENCOUNTER (OUTPATIENT)
Dept: SURGERY | Age: 33
Discharge: HOME | End: 2018-03-08
Attending: INTERNAL MEDICINE | Admitting: INTERNAL MEDICINE

## 2018-03-08 VITALS
DIASTOLIC BLOOD PRESSURE: 73 MMHG | BODY MASS INDEX: 34.55 KG/M2 | WEIGHT: 195 LBS | TEMPERATURE: 97.3 F | OXYGEN SATURATION: 100 % | HEART RATE: 71 BPM | HEIGHT: 63 IN | RESPIRATION RATE: 16 BRPM | SYSTOLIC BLOOD PRESSURE: 111 MMHG

## 2018-03-08 DIAGNOSIS — R10.13 EPIGASTRIC PAIN: ICD-10-CM

## 2018-03-08 LAB — PREGNANCY TEST URINE, POC: NEGATIVE

## 2018-03-08 PROCEDURE — 45380 COLONOSCOPY AND BIOPSY: CPT | Performed by: INTERNAL MEDICINE

## 2018-03-08 PROCEDURE — 43239 EGD BIOPSY SINGLE/MULTIPLE: CPT | Performed by: INTERNAL MEDICINE

## 2018-03-08 RX ORDER — OMEPRAZOLE 40 MG/1
20 CAPSULE, DELAYED RELEASE ORAL DAILY
Qty: 90 CAPSULE | Refills: 3 | Status: SHIPPED | OUTPATIENT
Start: 2018-03-08 | End: 2020-10-16 | Stop reason: CLARIF

## 2018-03-08 RX ORDER — SODIUM CHLORIDE, SODIUM LACTATE, POTASSIUM CHLORIDE, CALCIUM CHLORIDE 600; 310; 30; 20 MG/100ML; MG/100ML; MG/100ML; MG/100ML
INJECTION, SOLUTION INTRAVENOUS CONTINUOUS
Status: CANCELLED | OUTPATIENT
Start: 2018-03-08

## 2018-03-08 RX ORDER — SODIUM CHLORIDE 9 MG/ML
INJECTION, SOLUTION INTRAVENOUS CONTINUOUS
Status: DISCONTINUED | OUTPATIENT
Start: 2018-03-08 | End: 2018-03-09 | Stop reason: HOSPADM

## 2018-03-08 RX ADMIN — SODIUM CHLORIDE: 9 INJECTION, SOLUTION INTRAVENOUS at 11:50

## 2018-03-08 ASSESSMENT — PAIN DESCRIPTION - DESCRIPTORS: DESCRIPTORS: CONSTANT;ACHING

## 2018-03-08 ASSESSMENT — PAIN SCALES - GENERAL
PAINLEVEL_OUTOF10: 0
PAINLEVEL_OUTOF10: 0

## 2018-03-08 ASSESSMENT — PAIN - FUNCTIONAL ASSESSMENT: PAIN_FUNCTIONAL_ASSESSMENT: 0-10

## 2018-03-08 NOTE — PROGRESS NOTES
1304 drowsy, vss, call light in reach.  at bedside,  1315 denies needs  1325 denies needs, more alert  1338 head of bed up. Juice provided  1348 dr Tomas Alves provided update at bedside  1350 discharge instructions reviewed.  Verbalized understanding  1400 discharged to car via wheelchair

## 2018-03-08 NOTE — H&P
vision. ENT: No nose or sinus problems, no oral problems, no throat problems or hoarseness. Cardiovascular: No chest pain, no leg pain with walking, no palpitations, no ankle swelling. Respiratory: No shortness of breath, no persistent cough, no wheezing. Endocrine: No increased thirst, no increased urination. Gastrointestinal: No heartburn, no dysphagia, no abdominal pain, no loss of appetite, no nausea or vomiting, no diarrhea, no constipation, no melena, no hematochezia, no sceleral icterus or jaundice. Skin: No rashes. Musculoskeletal: No trouble walking or standing, no joint pain, no muscle pain. Allergy/Immune System: No allergies, no frequent infections. Neurological: No memory difficulties, no temporary blindness, no difficulty speaking, no headaches, no numbness. Psychiatric: No depression, no suicidal ideation, no auditory hallucinations. Hematological/Lymphatic: No lymphadenopathy, no frequent nose bleeds, no easy bruising. Genitourinary: No penile/vaginal discharge, no pain with urination, no trouble starting urinary stream, no hematuria. Physical Examination  Vital Signs: LMP 01/20/2018 (Approximate)  There is no height or weight on file to calculate BMI. General: The patient is a 28 y.o. female in No acute distress. HEENT: EOMI Grossly, Pupils reactive, no lymphadenopathy, oropharynx is without erythema, edema, or exudates, and moist mucus membranes. Lungs: Clear to auscultation bilaterally, no wheeze/crackles. Heart: Regular rate and rhythm, normal S1 & S2, no murmurs, rubs or gallops appreciated. Abdomen: Soft, non-tender, no rebound or guarding or peritoneal features, no masses, no hepatosplenomegaly. Extremities: Warm and dry, no clubbing, cyanosis, edema. Neuro: CN II-XII were intact grossly. Rectum:  There was no fistular, fissure, external hemorrhoid, tenderness, abscess, erythema or discharge    Labs:  CBC  @LASTLABOSUSHORT(WBC,WBCCOUNT,WBCFETAL,HGB,HCT,PLATELET,MCV)@ Glucose   Date Value Ref Range Status   02/15/2018 104 (H) 70 - 99 mg/dL Final     CO2   Date Value Ref Range Status   02/15/2018 27 21 - 32 mmol/L Final     BUN   Date Value Ref Range Status   02/15/2018 16 7 - 20 mg/dL Final     Lab Results   Component Value Date    ALT 20 02/15/2018    AST 18 02/15/2018     No results found for: AMYLASE  No results found for: LIPASE  No results found for: ESR  No components found for: CREACTIVEPR  No results found for: MP No components found for: ANTISMA, ANTIMITO  No results found for: CEA  No components found for: OCCULTBLOOD, OCCBLDSINPOC, OCCULTBLOODS, OCCBLD1, OCCBLD2, OCCBLD3, OCCULTBLOOD  No results found for: IRON, FERRITIN  No results found for: HAV    Assessment     Assessment and Plan: We will her EGD    Bibi Donahue MD PhD [facility]  [address] [facility] [address]  0ffice: [phone]  Fax: [phone]

## 2018-03-09 ENCOUNTER — HOSPITAL ENCOUNTER (OUTPATIENT)
Dept: PHYSICAL THERAPY | Age: 33
Discharge: HOME | End: 2018-03-09
Admitting: NURSE PRACTITIONER

## 2018-03-09 ENCOUNTER — HOSPITAL ENCOUNTER (OUTPATIENT)
Dept: MRI IMAGING | Age: 33
Discharge: HOME | End: 2018-03-09
Attending: NURSE PRACTITIONER | Admitting: NURSE PRACTITIONER

## 2018-03-09 DIAGNOSIS — G89.29 CHRONIC RIGHT-SIDED LOW BACK PAIN WITH RIGHT-SIDED SCIATICA: ICD-10-CM

## 2018-03-09 DIAGNOSIS — M54.41 CHRONIC RIGHT-SIDED LOW BACK PAIN WITH RIGHT-SIDED SCIATICA: ICD-10-CM

## 2018-03-09 DIAGNOSIS — M54.41 LOW BACK PAIN WITH RIGHT-SIDED SCIATICA: ICD-10-CM

## 2018-03-09 NOTE — FLOWSHEET NOTE
(    ) Dry needling    Post Tx Pain Ratin/10    Plan:(Fequency/duration/# of visits) 2 to 3 times a week 10 visits  [x] Continue per plan of care [] Alter current plan   [] Plan of care initiated [] Hold pending MD visit [] Discharge     Time In: 805  Time Out:  830  Timed Code Treatment Minutes: 8   Total Treatment Minutes:  25    Electronically signed by:  Barbara Nolen, 3/9/2018, 7:56 AM

## 2018-03-12 ENCOUNTER — TELEPHONE (OUTPATIENT)
Dept: GASTROENTEROLOGY | Age: 33
End: 2018-03-12

## 2018-03-15 DIAGNOSIS — M51.26 PROTRUDED LUMBAR DISC: Primary | ICD-10-CM

## 2018-03-15 DIAGNOSIS — M54.16 COMPRESSION OF LUMBAR NERVE ROOT: ICD-10-CM

## 2018-03-15 NOTE — ANESTHESIA POST-OP
Anesthesia Post-op Note    Patient:    Corine Moses  MRN:     [id number]   YOB: 1985    Anesthesia Post Evaluation    Final anesthesia type:  MAC-TIVA  Location of evaluation:  [facility]  Patient participation:   complete - patient participated  Level of consciousness:  awake  Pain score:    0  Airway patency:   patent  Nausea & Vomiting:   no nausea and no vomiting  Complications:  no  Cardiovascular status:  blood pressure returned to baseline  Respiratory status:   acceptable  Hydration status:   euvolemic    Myron Mccabe MD

## 2018-03-19 ENCOUNTER — OFFICE VISIT (OUTPATIENT)
Dept: GASTROENTEROLOGY | Age: 33
End: 2018-03-19

## 2018-03-19 VITALS
HEART RATE: 83 BPM | DIASTOLIC BLOOD PRESSURE: 72 MMHG | HEIGHT: 63 IN | SYSTOLIC BLOOD PRESSURE: 118 MMHG | WEIGHT: 204 LBS | OXYGEN SATURATION: 96 % | BODY MASS INDEX: 36.14 KG/M2

## 2018-03-19 DIAGNOSIS — K59.00 CONSTIPATION, UNSPECIFIED CONSTIPATION TYPE: ICD-10-CM

## 2018-03-19 DIAGNOSIS — K29.50 CHRONIC GASTRITIS WITHOUT BLEEDING, UNSPECIFIED GASTRITIS TYPE: ICD-10-CM

## 2018-03-19 DIAGNOSIS — K21.9 GASTROESOPHAGEAL REFLUX DISEASE WITHOUT ESOPHAGITIS: Primary | ICD-10-CM

## 2018-03-19 DIAGNOSIS — R19.7 DIARRHEA, UNSPECIFIED TYPE: ICD-10-CM

## 2018-03-19 PROCEDURE — 99214 OFFICE O/P EST MOD 30 MIN: CPT | Performed by: NURSE PRACTITIONER

## 2018-03-19 RX ORDER — DOCUSATE SODIUM 100 MG/1
100 CAPSULE, LIQUID FILLED ORAL DAILY PRN
Qty: 30 CAPSULE | Refills: 0 | Status: SHIPPED | OUTPATIENT
Start: 2018-03-19 | End: 2019-03-19 | Stop reason: CLARIF

## 2018-03-19 RX ORDER — TRAMADOL HYDROCHLORIDE 50 MG/1
50 TABLET ORAL EVERY 6 HOURS PRN
COMMUNITY
End: 2018-04-16 | Stop reason: ALTCHOICE

## 2018-03-19 RX ORDER — ONDANSETRON 4 MG/1
4 TABLET, FILM COATED ORAL EVERY 8 HOURS PRN
Qty: 30 TABLET | Refills: 0 | Status: SHIPPED | OUTPATIENT
Start: 2018-03-19 | End: 2019-03-19 | Stop reason: CLARIF

## 2018-03-19 ASSESSMENT — ENCOUNTER SYMPTOMS
CONSTIPATION: 1
PHOTOPHOBIA: 0
DIARRHEA: 1
COUGH: 0
ABDOMINAL PAIN: 1
BLOOD IN STOOL: 0
VOMITING: 0
SPUTUM PRODUCTION: 0
SHORTNESS OF BREATH: 0
BLURRED VISION: 0
NAUSEA: 1
BACK PAIN: 1
WHEEZING: 0
HEMOPTYSIS: 0
HEARTBURN: 0
DOUBLE VISION: 0

## 2018-03-19 NOTE — PROGRESS NOTES
3.4 - 5.0 g/dL Final    Albumin/Globulin Ratio 02/15/2018 1.9  1.1 - 2.2 Final    Total Bilirubin 02/15/2018 0.6  0.0 - 1.0 mg/dL Final    Alkaline Phosphatase 02/15/2018 40  40 - 129 U/L Final    ALT 02/15/2018 20  10 - 40 U/L Final    AST 02/15/2018 18  15 - 37 U/L Final    Globulin 02/15/2018 2.4  g/dL Final    Cholesterol, Total 02/15/2018 163  0 - 199 mg/dL Final    Triglycerides 02/15/2018 142  0 - 150 mg/dL Final    HDL 02/15/2018 40  40 - 60 mg/dL Final    LDL Calculated 02/15/2018 95  <100 mg/dL Final    VLDL Cholesterol Calculated 02/15/2018 28  Not Established mg/dL Final       Assessment      ICD-10-CM ICD-9-CM    1. Gastroesophageal reflux disease without esophagitis K21.9 530.81    2. Chronic gastritis without bleeding, unspecified gastritis type K29.50 535.10    3. Diarrhea, unspecified type R19.7 787.91    4. Constipation, unspecified constipation type K59.00 564.00        Plan    1. The patient was encouraged to continue taking Prilosec daily for treatment of acid reflux. 2.  Will order Zofran 4 mg take one tablet every 8 hours as needed for nausea. 3.  The patient was encouraged to due food diary to record foods that worsen her abdominal pain. Avoid foods that cause pain. 4.  Will order Colace 100 mg take daily during episodes of constipation. Encouraged increase in fiber intake and fluids. 5.  The patient's delayed stomach emptying may require her to eat small meals to avoid worsening nausea. Denies worsening. 6.  The patient was encouraged to follow-up on 3 months or sooner if symptoms worsen.

## 2018-03-19 NOTE — PATIENT INSTRUCTIONS
clear like water. Choose water and other caffeine-free clear liquids until you feel better. If you have kidney, heart, or liver disease and have to limit fluids, talk with your doctor before you increase the amount of fluids you drink. · Begin eating small amounts of mild foods the next day, if you feel like it. ¨ Try yogurt that has live cultures of Lactobacillus. (Check the label.)  ¨ Avoid spicy foods, fruits, alcohol, and caffeine until 48 hours after all symptoms are gone. ¨ Avoid chewing gum that contains sorbitol. ¨ Avoid dairy products (except for yogurt with Lactobacillus) while you have diarrhea and for 3 days after symptoms are gone. · The doctor may recommend that you take over-the-counter medicine, such as loperamide (Imodium), if you still have diarrhea after 6 hours. Read and follow all instructions on the label. Do not use this medicine if you have bloody diarrhea, a high fever, or other signs of serious illness. Call your doctor if you think you are having a problem with your medicine. When should you call for help? Call 911 anytime you think you may need emergency care. For example, call if:  ? · You passed out (lost consciousness). ? · Your stools are maroon or very bloody. ?Call your doctor now or seek immediate medical care if:  ? · You are dizzy or lightheaded, or you feel like you may faint. ? · Your stools are black and look like tar, or they have streaks of blood. ? · You have new or worse belly pain. ? · You have symptoms of dehydration, such as:  ¨ Dry eyes and a dry mouth. ¨ Passing only a little dark urine. ¨ Feeling thirstier than usual.   ? · You have a new or higher fever. ? Watch closely for changes in your health, and be sure to contact your doctor if:  ? · Your diarrhea is getting worse. ? · You see pus in the diarrhea. ? · You are not getting better after 2 days (48 hours). Where can you learn more? Go to https://chpevenkataeb.health-ResolutionTube. org and sign in to your [employer] account. Enter F541 in the [facility] box to learn more about \"Diarrhea: Care Instructions. \"     If you do not have an account, please click on the \"Sign Up Now\" link. Current as of: March 20, 2017  Content Version: 11.5  © 8846-7717 [employer]. Care instructions adapted under license by [facility] [place] ([facility]). If you have questions about a medical condition or this instruction, always ask your healthcare professional. [place] [address] any warranty or liability for your use of this information. Patient Education        Gastritis: Care Instructions  Your Care Instructions    Gastritis is a sore and upset stomach. It happens when something irritates the stomach lining. Many things can cause it. These include an infection such as the flu or something you ate or drank. Medicines or a sore on the lining of the stomach (ulcer) also can cause it. Your belly may bloat and ache. You may belch, vomit, and feel sick to your stomach. You should be able to relieve the problem by taking medicine. And it may help to change your diet. If gastritis lasts, your doctor may prescribe medicine. Follow-up care is a key part of your treatment and safety. Be sure to make and go to all appointments, and call your doctor if you are having problems. It's also a good idea to know your test results and keep a list of the medicines you take. How can you care for yourself at home? · If your doctor prescribed antibiotics, take them as directed. Do not stop taking them just because you feel better. You need to take the full course of antibiotics. · Be safe with medicines. If your doctor prescribed medicine to decrease stomach acid, take it as directed. Call your doctor if you think you are having a problem with your medicine.   · Do not take any other medicine, including over-the-counter pain relievers, without talking to your doctor first.  · If your doctor recommends information about ondansetron. Remember, keep this and all other medicines out of the reach of children, never share your medicines with others, and use this medication only for the indication prescribed. Every effort has been made to ensure that the information provided by [address] is accurate, up-to-date, and complete, but no guarantee is made to that effect. Drug information contained herein may be time sensitive. [facility] information has been compiled for use by healthcare practitioners and consumers in the [place] and therefore [facility] does not warrant that uses outside of the [place] are appropriate, unless specifically indicated otherwise. [facility]'s drug information does not endorse drugs, diagnose patients or recommend therapy. [facility]'s drug information is an informational resource designed to assist licensed healthcare practitioners in caring for their patients and/or to serve consumers viewing this service as a supplement to, and not a substitute for, the expertise, skill, knowledge and judgment of healthcare practitioners. The absence of a warning for a given drug or drug combination in no way should be construed to indicate that the drug or drug combination is safe, effective or appropriate for any given patient. [facility] does not assume any responsibility for any aspect of healthcare administered with the aid of information [facility] provides. The information contained herein is not intended to cover all possible uses, directions, precautions, warnings, drug interactions, allergic reactions, or adverse effects. If you have questions about the drugs you are taking, check with your doctor, nurse or pharmacist.  Copyright 6329-8100 [address]: 13.01. Revision date: 10/21/2016. Care instructions adapted under license by [facility] [place] ([facility]).  If you have questions about a medical condition or this instruction, always ask your healthcare professional. Mabel Christian OTC tablet. Swallow the pill whole. You may open the delayed-release capsule and sprinkle the medicine into a spoonful of applesauce to make swallowing easier. Swallow the mixture right away without chewing. Do not save for later use. Dissolve the powder in a small amount of water. Use 1 teaspoon of water for the 2.5-mg packet, or 1 Tablespoon of water for the 10-mg packet. Let the mixture stand for 2 or 3 minutes, then stir and drink right away. To make sure you get the entire dose, add a little more water to the same glass, swirl gently and drink right away. This mixture can also be given through a nasogastric (NG) feeding tube using only a catheter-tipped syringe. Shake the syringe well, then attach it to the NG tube and push the plunger down to empty the syringe into the tube. Refill the syringe with water and flush the tube to wash the contents down. Use this medicine for the full prescribed length of time. Your symptoms may improve before your condition is completely cleared. If you use omeprazole for longer than 3 years, you could develop a vitamin B-12 deficiency. Talk to your doctor about how to manage this condition if you develop it. Call your doctor if your symptoms do not improve, or if they get worse while using omeprazole. Some conditions are treated with a combination of omeprazole and antibiotics. Use all medications as directed by your doctor. Read the medication guide or patient instructions provided with each medication. Do not change your doses or medication schedule without your doctor's advice. This medicine can cause unusual results with certain medical tests, and you may need to stop using the medicine for a short time before a test. Tell any doctor who treats you that you are using omeprazole. Store at room temperature away from moisture and heat. What happens if I miss a dose? Take the missed dose as soon as you remember.  Skip the missed dose if it is almost time for your next scheduled dose. Do not  take extra medicine to make up the missed dose. What happens if I overdose? Seek emergency medical attention or call the Poison Help line at 1-981.733.6886. What should I avoid while taking omeprazole? This medicine can cause diarrhea, which may be a sign of a new infection. If you have diarrhea that is watery or bloody, call your doctor. Do not use anti-diarrhea medicine unless your doctor tells you to. What are the possible side effects of omeprazole? Get emergency medical help if you have signs of an allergic reaction: hives; difficulty breathing; swelling of your face, lips, tongue, or throat. Call your doctor at once if you have:  · severe stomach pain, diarrhea that is watery or bloody;  · seizure (convulsions);  · kidney problems --urinating more or less than usual, blood in your urine, swelling, rapid weight gain; or  · symptoms of low magnesium --dizziness, confusion; fast or uneven heart rate; tremors (shaking) or jerking muscle movements; feeling jittery; muscle cramps, muscle spasms in your hands and feet; cough or choking feeling. Common side effects may include:  · stomach pain, gas;  · nausea, vomiting, diarrhea; or  · headache. This is not a complete list of side effects and others may occur. Call your doctor for medical advice about side effects. You may report side effects to FDA at 7-542-ZXK-0607. What other drugs will affect omeprazole?   Ask a doctor or pharmacist if it is safe for you to take omeprazole if you are also using any of the following drugs:  · cilostazol;  · clopidogrel;  · diazepam (Valium);  · digoxin;  · disulfiram (Antabuse);  · erlotinib;  · iron-containing medicines (ferrous fumarate, ferrous gluconate, ferrous sulfate, and others);  · methotrexate;  · mycophenolate mofetil;  · phenytoin;  · Thibodaux's wort;  · tacrolimus;  · warfarin (Coumadin, Jantoven);  · an antibiotic --ampicillin, amoxicillin, clarithromycin, rifampin;  · antifungal reactions, or adverse effects. If you have questions about the drugs you are taking, check with your doctor, nurse or pharmacist.  Copyright 3680-2285 [address]: 17.01. Revision date: 2/2/2015. Care instructions adapted under license by [facility] ([facility]). If you have questions about a medical condition or this instruction, always ask your healthcare professional. [place] [address] any warranty or liability for your use of this information.

## 2018-04-01 ENCOUNTER — HOSPITAL ENCOUNTER (OUTPATIENT)
Dept: OTHER | Age: 33
Discharge: HOME | End: 2018-04-30
Attending: NURSE PRACTITIONER | Admitting: NURSE PRACTITIONER

## 2018-04-16 ENCOUNTER — OFFICE VISIT (OUTPATIENT)
Dept: INTERNAL MEDICINE CLINIC | Age: 33
End: 2018-04-16

## 2018-04-16 VITALS — DIASTOLIC BLOOD PRESSURE: 72 MMHG | HEART RATE: 87 BPM | OXYGEN SATURATION: 98 % | SYSTOLIC BLOOD PRESSURE: 108 MMHG

## 2018-04-16 DIAGNOSIS — R10.84 GENERALIZED ABDOMINAL PAIN: Primary | ICD-10-CM

## 2018-04-16 DIAGNOSIS — R11.0 NAUSEA: ICD-10-CM

## 2018-04-16 DIAGNOSIS — G89.29 CHRONIC RIGHT-SIDED LOW BACK PAIN WITH RIGHT-SIDED SCIATICA: ICD-10-CM

## 2018-04-16 DIAGNOSIS — M54.41 CHRONIC RIGHT-SIDED LOW BACK PAIN WITH RIGHT-SIDED SCIATICA: ICD-10-CM

## 2018-04-16 PROCEDURE — 99213 OFFICE O/P EST LOW 20 MIN: CPT | Performed by: NURSE PRACTITIONER

## 2018-04-16 RX ORDER — ONDANSETRON 4 MG/1
4 TABLET, ORALLY DISINTEGRATING ORAL EVERY 8 HOURS PRN
Qty: 30 TABLET | Refills: 1 | Status: SHIPPED | OUTPATIENT
Start: 2018-04-16 | End: 2020-10-16 | Stop reason: CLARIF

## 2018-07-13 ENCOUNTER — OFFICE VISIT (OUTPATIENT)
Dept: INTERNAL MEDICINE CLINIC | Age: 33
End: 2018-07-13

## 2018-07-13 VITALS — OXYGEN SATURATION: 98 % | SYSTOLIC BLOOD PRESSURE: 112 MMHG | DIASTOLIC BLOOD PRESSURE: 76 MMHG | HEART RATE: 82 BPM

## 2018-07-13 DIAGNOSIS — Z12.39 SCREENING BREAST EXAMINATION: ICD-10-CM

## 2018-07-13 DIAGNOSIS — Z01.419 ENCOUNTER FOR CERVICAL PAP SMEAR WITH PELVIC EXAM: Primary | ICD-10-CM

## 2018-07-13 PROCEDURE — 99395 PREV VISIT EST AGE 18-39: CPT | Performed by: NURSE PRACTITIONER

## 2018-07-13 NOTE — PROGRESS NOTES
HDL 40 02/15/2018    HDL 30 (L) 09/23/2014     Lab Results   Component Value Date    LDLCALC 95 02/15/2018    LDLCHOLESTEROL 92 09/23/2014     No results found for: LABA1C  Lab Results   Component Value Date    TSH 1.1 09/23/2014     ASSESSMENT:      1. Encounter for cervical Pap smear with pelvic exam    2. Screening breast examination      PLAN:  1. Pap smear done; breast exam complete. Counseled on BSE, STD prevention, feminine hygiene, adequate intake of calcium and vitamin D,skin cancer screening, diet and exercise. Return annually or prn. Care discussed with patient. Questions answered. Patient verbalizes understanding and agrees with plan. After visit summary provided. Advised to call for any problems, questions, or concerns. Return in about 1 year (around 7/13/2019).     WILD Gutierrez CNP  07/13/18  8:41 AM

## 2018-07-18 LAB
HPV COMMENT: NORMAL
HPV TYPE 16: NOT DETECTED
HPV TYPE 18: NOT DETECTED
HPVOH (OTHER TYPES): NOT DETECTED

## 2019-03-19 ENCOUNTER — OFFICE VISIT (OUTPATIENT)
Dept: INTERNAL MEDICINE CLINIC | Age: 34
End: 2019-03-19
Payer: COMMERCIAL

## 2019-03-19 VITALS
OXYGEN SATURATION: 98 % | BODY MASS INDEX: 38.88 KG/M2 | HEART RATE: 90 BPM | DIASTOLIC BLOOD PRESSURE: 70 MMHG | HEIGHT: 63 IN | RESPIRATION RATE: 18 BRPM | WEIGHT: 219.4 LBS | SYSTOLIC BLOOD PRESSURE: 115 MMHG

## 2019-03-19 DIAGNOSIS — R53.83 FATIGUE, UNSPECIFIED TYPE: ICD-10-CM

## 2019-03-19 DIAGNOSIS — R10.12 LUQ PAIN: ICD-10-CM

## 2019-03-19 DIAGNOSIS — Z00.00 ENCOUNTER FOR MEDICAL EXAMINATION TO ESTABLISH CARE: ICD-10-CM

## 2019-03-19 DIAGNOSIS — J30.89 SEASONAL ALLERGIC RHINITIS DUE TO OTHER ALLERGIC TRIGGER: ICD-10-CM

## 2019-03-19 DIAGNOSIS — Z00.00 ENCOUNTER FOR MEDICAL EXAMINATION TO ESTABLISH CARE: Primary | ICD-10-CM

## 2019-03-19 LAB
A/G RATIO: 1.5 (ref 1.1–2.2)
ALBUMIN SERPL-MCNC: 4.6 G/DL (ref 3.4–5)
ALP BLD-CCNC: 51 U/L (ref 40–129)
ALT SERPL-CCNC: 29 U/L (ref 10–40)
ANION GAP SERPL CALCULATED.3IONS-SCNC: 14 MMOL/L (ref 3–16)
AST SERPL-CCNC: 22 U/L (ref 15–37)
BASOPHILS ABSOLUTE: 0.1 K/UL (ref 0–0.2)
BASOPHILS RELATIVE PERCENT: 0.7 %
BILIRUB SERPL-MCNC: 0.4 MG/DL (ref 0–1)
BILIRUBIN URINE: NEGATIVE
BLOOD, URINE: NEGATIVE
BUN BLDV-MCNC: 7 MG/DL (ref 7–20)
CALCIUM SERPL-MCNC: 9.3 MG/DL (ref 8.3–10.6)
CHLORIDE BLD-SCNC: 106 MMOL/L (ref 99–110)
CHOLESTEROL, FASTING: 153 MG/DL (ref 0–199)
CLARITY: CLEAR
CO2: 22 MMOL/L (ref 21–32)
COLOR: NORMAL
CREAT SERPL-MCNC: 0.6 MG/DL (ref 0.6–1.1)
EOSINOPHILS ABSOLUTE: 0.2 K/UL (ref 0–0.6)
EOSINOPHILS RELATIVE PERCENT: 2 %
EPITHELIAL CELLS, UA: 2 /HPF (ref 0–5)
GFR AFRICAN AMERICAN: >60
GFR NON-AFRICAN AMERICAN: >60
GLOBULIN: 3.1 G/DL
GLUCOSE BLD-MCNC: 95 MG/DL (ref 70–99)
GLUCOSE URINE: NEGATIVE MG/DL
HCT VFR BLD CALC: 41.8 % (ref 36–48)
HDLC SERPL-MCNC: 31 MG/DL (ref 40–60)
HEMOGLOBIN: 14.1 G/DL (ref 12–16)
HYALINE CASTS: 2 /LPF (ref 0–8)
KETONES, URINE: NEGATIVE MG/DL
LDL CHOLESTEROL CALCULATED: 81 MG/DL
LEUKOCYTE ESTERASE, URINE: NEGATIVE
LIPASE: 23 U/L (ref 13–60)
LYMPHOCYTES ABSOLUTE: 3.6 K/UL (ref 1–5.1)
LYMPHOCYTES RELATIVE PERCENT: 41.6 %
MCH RBC QN AUTO: 29.3 PG (ref 26–34)
MCHC RBC AUTO-ENTMCNC: 33.8 G/DL (ref 31–36)
MCV RBC AUTO: 86.5 FL (ref 80–100)
MICROSCOPIC EXAMINATION: NORMAL
MONOCYTES ABSOLUTE: 0.4 K/UL (ref 0–1.3)
MONOCYTES RELATIVE PERCENT: 4.3 %
NEUTROPHILS ABSOLUTE: 4.4 K/UL (ref 1.7–7.7)
NEUTROPHILS RELATIVE PERCENT: 51.4 %
NITRITE, URINE: NEGATIVE
PDW BLD-RTO: 12.7 % (ref 12.4–15.4)
PH UA: 6 (ref 5–8)
PLATELET # BLD: 287 K/UL (ref 135–450)
PMV BLD AUTO: 8.6 FL (ref 5–10.5)
POTASSIUM SERPL-SCNC: 4.1 MMOL/L (ref 3.5–5.1)
PROTEIN UA: NEGATIVE MG/DL
RBC # BLD: 4.83 M/UL (ref 4–5.2)
RBC UA: 3 /HPF (ref 0–4)
SODIUM BLD-SCNC: 142 MMOL/L (ref 136–145)
SPECIFIC GRAVITY UA: 1.03 (ref 1–1.03)
TOTAL PROTEIN: 7.7 G/DL (ref 6.4–8.2)
TRIGLYCERIDE, FASTING: 203 MG/DL (ref 0–150)
TSH REFLEX: 2.07 UIU/ML (ref 0.27–4.2)
UROBILINOGEN, URINE: 1 E.U./DL
VLDLC SERPL CALC-MCNC: 41 MG/DL
WBC # BLD: 8.6 K/UL (ref 4–11)
WBC UA: 2 /HPF (ref 0–5)

## 2019-03-19 PROCEDURE — 99213 OFFICE O/P EST LOW 20 MIN: CPT | Performed by: NURSE PRACTITIONER

## 2019-03-19 RX ORDER — CETIRIZINE HYDROCHLORIDE 10 MG/1
10 TABLET ORAL DAILY
Qty: 90 TABLET | Refills: 1 | Status: SHIPPED | OUTPATIENT
Start: 2019-03-19 | End: 2019-04-18

## 2019-03-19 RX ORDER — CETIRIZINE HYDROCHLORIDE 10 MG/1
10 TABLET ORAL DAILY
Qty: 30 TABLET | Refills: 0 | Status: SHIPPED | OUTPATIENT
Start: 2019-03-19 | End: 2019-03-19 | Stop reason: SDUPTHER

## 2019-03-19 ASSESSMENT — PATIENT HEALTH QUESTIONNAIRE - PHQ9
DEPRESSION UNABLE TO ASSESS: FUNCTIONAL CAPACITY MOTIVATION LIMITS ACCURACY
SUM OF ALL RESPONSES TO PHQ QUESTIONS 1-9: 2
1. LITTLE INTEREST OR PLEASURE IN DOING THINGS: 1
2. FEELING DOWN, DEPRESSED OR HOPELESS: 1
SUM OF ALL RESPONSES TO PHQ QUESTIONS 1-9: 2
SUM OF ALL RESPONSES TO PHQ9 QUESTIONS 1 & 2: 2

## 2019-03-19 ASSESSMENT — ENCOUNTER SYMPTOMS
RHINORRHEA: 1
DIARRHEA: 0
SHORTNESS OF BREATH: 0
APNEA: 0
EYES NEGATIVE: 1
CHEST TIGHTNESS: 0
NAUSEA: 0
VOMITING: 0
SINUS PRESSURE: 0
ALLERGIC/IMMUNOLOGIC NEGATIVE: 1
COUGH: 1
SINUS PAIN: 0
ABDOMINAL PAIN: 1
COLOR CHANGE: 0

## 2019-03-20 ENCOUNTER — HOSPITAL ENCOUNTER (OUTPATIENT)
Dept: ULTRASOUND IMAGING | Age: 34
Discharge: HOME | End: 2019-03-20
Payer: COMMERCIAL

## 2019-03-20 DIAGNOSIS — N20.0 RENAL CALCULUS, LEFT: Primary | ICD-10-CM

## 2019-03-20 DIAGNOSIS — R10.12 LUQ PAIN: ICD-10-CM

## 2019-03-20 LAB
FOLATE: 9.5 NG/ML (ref 4.78–24.2)
VITAMIN B-12: 355 PG/ML (ref 211–911)

## 2019-03-20 PROCEDURE — 76700 US EXAM ABDOM COMPLETE: CPT

## 2019-03-21 LAB — VITAMIN D 1,25-DIHYDROXY: 35.8 PG/ML (ref 19.9–79.3)

## 2020-10-16 ENCOUNTER — OFFICE VISIT (OUTPATIENT)
Dept: INTERNAL MEDICINE CLINIC | Age: 35
End: 2020-10-16
Payer: COMMERCIAL

## 2020-10-16 VITALS
DIASTOLIC BLOOD PRESSURE: 82 MMHG | WEIGHT: 215.8 LBS | BODY MASS INDEX: 38.24 KG/M2 | RESPIRATION RATE: 16 BRPM | HEIGHT: 63 IN | HEART RATE: 91 BPM | SYSTOLIC BLOOD PRESSURE: 122 MMHG | OXYGEN SATURATION: 98 %

## 2020-10-16 LAB
A/G RATIO: 1.7 (ref 1.1–2.2)
ALBUMIN SERPL-MCNC: 4.4 G/DL (ref 3.4–5)
ALP BLD-CCNC: 55 U/L (ref 40–129)
ALT SERPL-CCNC: 29 U/L (ref 10–40)
ANION GAP SERPL CALCULATED.3IONS-SCNC: 12 MMOL/L (ref 3–16)
AST SERPL-CCNC: 17 U/L (ref 15–37)
BASOPHILS ABSOLUTE: 0.1 K/UL (ref 0–0.2)
BASOPHILS RELATIVE PERCENT: 1 %
BILIRUB SERPL-MCNC: 0.7 MG/DL (ref 0–1)
BUN BLDV-MCNC: 11 MG/DL (ref 7–20)
CALCIUM SERPL-MCNC: 9.5 MG/DL (ref 8.3–10.6)
CHLORIDE BLD-SCNC: 104 MMOL/L (ref 99–110)
CHOLESTEROL, FASTING: 163 MG/DL (ref 0–199)
CO2: 24 MMOL/L (ref 21–32)
CREAT SERPL-MCNC: 0.5 MG/DL (ref 0.6–1.1)
EOSINOPHILS ABSOLUTE: 0.4 K/UL (ref 0–0.6)
EOSINOPHILS RELATIVE PERCENT: 4.7 %
GFR AFRICAN AMERICAN: >60
GFR NON-AFRICAN AMERICAN: >60
GLOBULIN: 2.6 G/DL
GLUCOSE BLD-MCNC: 120 MG/DL (ref 70–99)
HCT VFR BLD CALC: 39.7 % (ref 36–48)
HDLC SERPL-MCNC: 40 MG/DL (ref 40–60)
HEMOGLOBIN: 13.5 G/DL (ref 12–16)
LDL CHOLESTEROL CALCULATED: 101 MG/DL
LYMPHOCYTES ABSOLUTE: 3.4 K/UL (ref 1–5.1)
LYMPHOCYTES RELATIVE PERCENT: 38 %
MCH RBC QN AUTO: 29.6 PG (ref 26–34)
MCHC RBC AUTO-ENTMCNC: 33.9 G/DL (ref 31–36)
MCV RBC AUTO: 87.3 FL (ref 80–100)
MONOCYTES ABSOLUTE: 0.5 K/UL (ref 0–1.3)
MONOCYTES RELATIVE PERCENT: 5.1 %
NEUTROPHILS ABSOLUTE: 4.6 K/UL (ref 1.7–7.7)
NEUTROPHILS RELATIVE PERCENT: 51.2 %
PDW BLD-RTO: 12.4 % (ref 12.4–15.4)
PLATELET # BLD: 297 K/UL (ref 135–450)
PMV BLD AUTO: 8.6 FL (ref 5–10.5)
POTASSIUM SERPL-SCNC: 4.6 MMOL/L (ref 3.5–5.1)
RBC # BLD: 4.54 M/UL (ref 4–5.2)
SODIUM BLD-SCNC: 140 MMOL/L (ref 136–145)
TOTAL PROTEIN: 7 G/DL (ref 6.4–8.2)
TRIGLYCERIDE, FASTING: 112 MG/DL (ref 0–150)
TSH REFLEX: 1.62 UIU/ML (ref 0.27–4.2)
VLDLC SERPL CALC-MCNC: 22 MG/DL
WBC # BLD: 8.9 K/UL (ref 4–11)

## 2020-10-16 PROCEDURE — 99395 PREV VISIT EST AGE 18-39: CPT | Performed by: NURSE PRACTITIONER

## 2020-10-16 PROCEDURE — 36415 COLL VENOUS BLD VENIPUNCTURE: CPT | Performed by: NURSE PRACTITIONER

## 2020-10-16 PROCEDURE — 90471 IMMUNIZATION ADMIN: CPT | Performed by: NURSE PRACTITIONER

## 2020-10-16 PROCEDURE — 93000 ELECTROCARDIOGRAM COMPLETE: CPT | Performed by: NURSE PRACTITIONER

## 2020-10-16 PROCEDURE — 90686 IIV4 VACC NO PRSV 0.5 ML IM: CPT | Performed by: NURSE PRACTITIONER

## 2020-10-16 ASSESSMENT — ENCOUNTER SYMPTOMS
VOMITING: 0
SINUS PRESSURE: 0
CHEST TIGHTNESS: 0
COLOR CHANGE: 0
APNEA: 0
SINUS PAIN: 0
SHORTNESS OF BREATH: 0
COUGH: 0
ABDOMINAL PAIN: 0
NAUSEA: 0
DIARRHEA: 0

## 2020-10-16 ASSESSMENT — PATIENT HEALTH QUESTIONNAIRE - PHQ9
1. LITTLE INTEREST OR PLEASURE IN DOING THINGS: 0
DEPRESSION UNABLE TO ASSESS: FUNCTIONAL CAPACITY MOTIVATION LIMITS ACCURACY
2. FEELING DOWN, DEPRESSED OR HOPELESS: 0
SUM OF ALL RESPONSES TO PHQ QUESTIONS 1-9: 0
SUM OF ALL RESPONSES TO PHQ QUESTIONS 1-9: 0
SUM OF ALL RESPONSES TO PHQ9 QUESTIONS 1 & 2: 0
SUM OF ALL RESPONSES TO PHQ QUESTIONS 1-9: 0

## 2020-10-16 NOTE — PROGRESS NOTES
10/16/2020    Giovanni Martin (:  1985) is a 38 y.o. female, here for a preventive medicine evaluation. Patient Active Problem List   Diagnosis    Migraine    Fatigue    Obesity    Epigastric pain    Generalized abdominal pain    Diarrhea     Patient states that over the last year she has been having intermittent episodes over the last several months where she will feel as if her blood pressure will be elevated and her pulse will be >100. Denies any chest pain or shortness of breath or leg swelling at baseline, but does get slight chest pressure during these episodes only. Episodes last approximately 30 seconds in length and are occurring 1-2 times/month on average. Denies feeling anxious. Father with CAD. -flu vaccine: wants today    Review of Systems   Constitutional: Negative for activity change, appetite change, fatigue and fever. HENT: Negative for congestion, nosebleeds, sinus pressure and sinus pain. Respiratory: Negative for apnea, cough, chest tightness and shortness of breath. Cardiovascular: Positive for palpitations. Negative for chest pain. Gastrointestinal: Negative for abdominal pain, diarrhea, nausea and vomiting. Genitourinary: Negative for difficulty urinating, flank pain and hematuria. Musculoskeletal: Negative for arthralgias, joint swelling and myalgias. Skin: Negative for color change and rash. Neurological: Negative for dizziness, light-headedness and headaches. Psychiatric/Behavioral: Negative. Negative for behavioral problems. Prior to Visit Medications    Not on File        Allergies   Allergen Reactions    Alcohol Other (See Comments)     Hives, fever, redness, nausea.     Pcn [Penicillins] Hives    Tramadol      Pt states causes HA    Ethanol Rash    Hydrocodone-Acetaminophen Itching       Past Medical History:   Diagnosis Date    GERD (gastroesophageal reflux disease)        Past Surgical History:   Procedure Laterality Date    APPENDECTOMY  Feb 2004    CHOLECYSTECTOMY  Sep 2006    COLONOSCOPY  03/08/2018    colon polyp, biopsy, repeat age 47   [place] ENDOMETRIAL ABLATION  Dec 2012    ENDOSCOPY, COLON, DIAGNOSTIC  03/08/2016    biopsy, gastritis, follow up as needed    TUBAL LIGATION  Sep 2005       Social History     Socioeconomic History    Marital status: Legally      Spouse name: Not on file    Number of children: Not on file    Years of education: Not on file    Highest education level: Not on file   Occupational History    Occupation: Clerical     Comment: [employer]   Social Needs    Financial resource strain: Not on file    Food insecurity     Worry: Not on file     Inability: Not on file   [employer] needs     Medical: Not on file     Non-medical: Not on file   Tobacco Use    Smoking status: Never Smoker    Smokeless tobacco: Never Used   Substance and Sexual Activity    Alcohol use: No     Comment: Allergic    Drug use: No    Sexual activity: Yes     Partners: Male   Lifestyle    Physical activity     Days per week: Not on file     Minutes per session: Not on file    Stress: Not on file   Relationships    Social connections     Talks on phone: Not on file     Gets together: Not on file     Attends Church service: Not on file     Active member of club or organization: Not on file     Attends meetings of clubs or organizations: Not on file     Relationship status: Not on file    Intimate partner violence     Fear of current or ex partner: Not on file     Emotionally abused: Not on file     Physically abused: Not on file     Forced sexual activity: Not on file   Other Topics Concern    Not on file   Social History Narrative    Not on file        Family History   Problem Relation Age of Onset    Diabetes Mother     Heart Disease Father     Lung Cancer Father     Diabetes Father     High Cholesterol Father     Obesity Sister     Obesity Brother        ADVANCE DIRECTIVE: N, <no TRIG 238 09/23/2014    TRIGLYCFAST 203 03/19/2019    HDL 31 03/19/2019    HDL 40 02/15/2018    HDL 30 09/23/2014    LDLCHOLESTEROL 92 09/23/2014    LDLCALC 81 03/19/2019    LDLCALC 95 02/15/2018    GLUCOSE 95 03/19/2019       The ASCVD Risk score (Loraine Key., et al., 2013) failed to calculate for the following reasons: The 2013 ASCVD risk score is only valid for ages 36 to 78    Immunization History   Administered Date(s) Administered    Influenza, Quadv, IM, PF (6 mo and older Fluzone, Flulaval, Fluarix, and 3 yrs and older Afluria) 10/16/2020    Tdap (Boostrix, Adacel) 02/15/2018       Health Maintenance   Topic Date Due    Varicella vaccine (1 of 2 - 2-dose childhood series) 11/06/2020 (Originally 2/24/1986)    Cervical cancer screen  07/13/2023    DTaP/Tdap/Td vaccine (2 - Td) 02/15/2028    Colon cancer screen colonoscopy  03/08/2034    Flu vaccine  Completed    HIV screen  Completed    Hepatitis A vaccine  Aged Out    Hepatitis B vaccine  Aged Out    Hib vaccine  Aged Out    Meningococcal (ACWY) vaccine  Aged Out    Pneumococcal 0-64 years Vaccine  Aged Out       ASSESSMENT/PLAN:  1. Encounter for preventive health examination- no acute concerns on exam aside from as mentioned below; BP and all vitals at goal; separate issues discussed below. - CBC Auto Differential; Future  - Comprehensive Metabolic Panel; Future  - Lipid, Fasting; Future  - TSH with Reflex; Future    2. Palpitations- overall her vitals are stable and cardiac PE normal, however, there are some concerns of possible negative precordial t-waves on EKG which was reviewed with colloborating MD Dr Carl Donnelly. Given this has been ongoing for 3-4 months, less likely acute issue, but does need to worked up further from a cardiac standpoint. Holter and stress test ordered today. Also holter study to assess for other underlying arrhythmias. Check TSH and labs as below.   - TSH with Reflex;  Future  - ECHO Complete 2D W Doppler W Color; Future  - Holter Monitor 48 Hour; Future  - EKG 12 Lead; Future  - EKG 12 Lead  - CARDIAC STRESS TEST EXERCISE ONLY; Future    3. Need for immunization against influenza- will update today  - INFLUENZA, QUADV, 3 YRS AND OLDER, IM PF, PREFILL SYR OR SDV, 0.5ML (AFLURIA QUADV, PF)    Course of treatment, including any medications, possible imaging, referrals, and follow ups discussed with patient. All risks and benefits and possible side effects discussed with patient who agrees to plan of care and verbalizes understanding. All labs and imaging reviewed. Return in about 3 months (around 1/16/2021). An electronic signature was used to authenticate this note.     --WILD Rushing - CNP on 10/16/2020 at 10:28 AM

## 2020-10-17 DIAGNOSIS — R73.01 IFG (IMPAIRED FASTING GLUCOSE): ICD-10-CM

## 2020-10-17 LAB
ESTIMATED AVERAGE GLUCOSE: 105.4 MG/DL
HBA1C MFR BLD: 5.3 %

## 2021-01-18 ENCOUNTER — OFFICE VISIT (OUTPATIENT)
Dept: INTERNAL MEDICINE CLINIC | Age: 36
End: 2021-01-18
Payer: COMMERCIAL

## 2021-01-18 VITALS
SYSTOLIC BLOOD PRESSURE: 130 MMHG | OXYGEN SATURATION: 98 % | WEIGHT: 224.8 LBS | HEIGHT: 62 IN | BODY MASS INDEX: 41.37 KG/M2 | DIASTOLIC BLOOD PRESSURE: 71 MMHG | RESPIRATION RATE: 20 BRPM | HEART RATE: 77 BPM

## 2021-01-18 DIAGNOSIS — M54.2 NECK PAIN, ACUTE: ICD-10-CM

## 2021-01-18 DIAGNOSIS — R00.2 PALPITATIONS: Primary | ICD-10-CM

## 2021-01-18 PROCEDURE — 99214 OFFICE O/P EST MOD 30 MIN: CPT | Performed by: NURSE PRACTITIONER

## 2021-01-18 RX ORDER — PREDNISONE 10 MG/1
TABLET ORAL
Qty: 20 TABLET | Refills: 0 | Status: SHIPPED | OUTPATIENT
Start: 2021-01-18

## 2021-01-18 RX ORDER — TIZANIDINE 2 MG/1
2 TABLET ORAL 3 TIMES DAILY PRN
Qty: 30 TABLET | Refills: 0 | Status: SHIPPED | OUTPATIENT
Start: 2021-01-18 | End: 2021-01-28

## 2021-01-18 ASSESSMENT — ENCOUNTER SYMPTOMS
NAUSEA: 0
DIARRHEA: 0
VOMITING: 0
SHORTNESS OF BREATH: 0
APNEA: 0
SINUS PRESSURE: 0
CHEST TIGHTNESS: 0
SINUS PAIN: 0
COUGH: 0
COLOR CHANGE: 0
ABDOMINAL PAIN: 0

## 2021-01-18 ASSESSMENT — PATIENT HEALTH QUESTIONNAIRE - PHQ9
SUM OF ALL RESPONSES TO PHQ9 QUESTIONS 1 & 2: 0
SUM OF ALL RESPONSES TO PHQ QUESTIONS 1-9: 0
1. LITTLE INTEREST OR PLEASURE IN DOING THINGS: 0
2. FEELING DOWN, DEPRESSED OR HOPELESS: 0
DEPRESSION UNABLE TO ASSESS: FUNCTIONAL CAPACITY MOTIVATION LIMITS ACCURACY

## 2021-01-18 NOTE — PROGRESS NOTES
Sadie Larryr  1985 01/18/21    Chief Complaint   Patient presents with    3 Month Follow-Up       SUBJECTIVE:      3 month follow up: She has not had anymore occurrences of palpitations over the last 3 months since she was last seen here in October. We did obtain some lab work which was benign, however, she has not yet completed her Echo,stress,  or Holter study. She does admit that her stress levels have been increase over the last several months d/t issues with her ex. Has been on Cymbalta in the past, but did not tolerate this. She does admit that approximately 2-3 weeks ago she woke up with posterior neck pain, central in between shoulder blades. Denies any known injuries and states that she felt the pain after strerching one morning. Denies any numbness or tingling down her BUE or other neuro symptoms. Denies any nuchal rigidity, fevers, or chills. Review of Systems   Constitutional: Negative for activity change, appetite change, fatigue and fever. HENT: Negative for congestion, nosebleeds, sinus pressure and sinus pain. Respiratory: Negative for apnea, cough, chest tightness and shortness of breath. Cardiovascular: Negative for chest pain and palpitations. Gastrointestinal: Negative for abdominal pain, diarrhea, nausea and vomiting. Genitourinary: Negative for difficulty urinating, flank pain and hematuria. Musculoskeletal: Positive for myalgias and neck pain. Negative for arthralgias, joint swelling and neck stiffness. Skin: Negative for color change and rash. Neurological: Negative for dizziness, light-headedness and headaches. Psychiatric/Behavioral: Negative. Negative for behavioral problems. OBJECTIVE:    /71   Pulse 77   Resp 20   Ht 5' 2\" (1.575 m)   Wt 224 lb 12.8 oz (102 kg)   SpO2 98%   BMI 41.12 kg/m²     Physical Exam  Constitutional:       General: She is not in acute distress. Appearance: She is well-developed. She is not diaphoretic. HENT:      Head: Normocephalic and atraumatic. Nose: Nose normal.      Mouth/Throat:      Pharynx: No oropharyngeal exudate. Neck:      Musculoskeletal: Normal range of motion and neck supple. No edema, erythema or muscular tenderness. Cardiovascular:      Rate and Rhythm: Normal rate and regular rhythm. Heart sounds: Normal heart sounds. No murmur. No friction rub. Pulmonary:      Effort: Pulmonary effort is normal. No respiratory distress. Breath sounds: Normal breath sounds. Abdominal:      General: Bowel sounds are normal.      Palpations: Abdomen is soft. Tenderness: There is no abdominal tenderness. Musculoskeletal: Normal range of motion. Skin:     General: Skin is warm and dry. Capillary Refill: Capillary refill takes less than 2 seconds. Neurological:      Mental Status: She is alert and oriented to person, place, and time. Coordination: Coordination normal.   Psychiatric:         Behavior: Behavior normal.         Thought Content: Thought content normal.         Judgment: Judgment normal.         ASSESSMENT:    1. Palpitations    2. Neck pain, acute        PLAN:    Selena was seen today for 3 month follow-up. Diagnoses and all orders for this visit:    Palpitations- fortunately, her symptoms have abated; however, I did encouraged her to still complete cardiac work up to r/o possible cardiac issue. Possibly stress/exiety related? Neck pain, acute- seems to be more trapezius strain; negative meningeal signs; gently stretching; PRN Ibuprofen/Tylenol, prednisone taper, and Tizanidine. -     tiZANidine (ZANAFLEX) 2 MG tablet; Take 1 tablet by mouth 3 times daily as needed (neck pain)  -     predniSONE (DELTASONE) 10 MG tablet;  Take 4 tablets daily for 2 days, then 3 tablets for 2 days, 2 tablets for 2 days, then 1 tablet for 2 days Course of treatment, including any medications, possible imaging, referrals, and follow ups discussed with patient. All risks and benefits and possible side effects discussed with patient who agrees to plan of care and verbalizes understanding. All labs and imaging reviewed. No flowsheet data found.     Return in about 6 months (around 7/18/2021) for Annual Physical.